# Patient Record
Sex: MALE | Race: WHITE | NOT HISPANIC OR LATINO | URBAN - METROPOLITAN AREA
[De-identification: names, ages, dates, MRNs, and addresses within clinical notes are randomized per-mention and may not be internally consistent; named-entity substitution may affect disease eponyms.]

---

## 2021-11-05 ENCOUNTER — OUTPATIENT (OUTPATIENT)
Dept: OUTPATIENT SERVICES | Facility: HOSPITAL | Age: 66
LOS: 1 days | Discharge: HOME | End: 2021-11-05
Payer: COMMERCIAL

## 2021-11-05 DIAGNOSIS — Z13.6 ENCOUNTER FOR SCREENING FOR CARDIOVASCULAR DISORDERS: ICD-10-CM

## 2021-11-05 PROCEDURE — 78452 HT MUSCLE IMAGE SPECT MULT: CPT | Mod: 26

## 2024-05-23 ENCOUNTER — NON-APPOINTMENT (OUTPATIENT)
Age: 69
End: 2024-05-23

## 2024-05-23 ENCOUNTER — APPOINTMENT (OUTPATIENT)
Dept: ORTHOPEDIC SURGERY | Facility: CLINIC | Age: 69
End: 2024-05-23
Payer: COMMERCIAL

## 2024-05-23 DIAGNOSIS — S83.91XA SPRAIN OF UNSPECIFIED SITE OF RIGHT KNEE, INITIAL ENCOUNTER: ICD-10-CM

## 2024-05-23 DIAGNOSIS — S93.401A SPRAIN OF UNSPECIFIED LIGAMENT OF RIGHT ANKLE, INITIAL ENCOUNTER: ICD-10-CM

## 2024-05-23 PROBLEM — Z00.00 ENCOUNTER FOR PREVENTIVE HEALTH EXAMINATION: Status: ACTIVE | Noted: 2024-05-23

## 2024-05-23 PROCEDURE — 99203 OFFICE O/P NEW LOW 30 MIN: CPT

## 2024-05-23 PROCEDURE — 73610 X-RAY EXAM OF ANKLE: CPT | Mod: RT

## 2024-05-23 PROCEDURE — 73562 X-RAY EXAM OF KNEE 3: CPT | Mod: RT

## 2024-05-23 RX ORDER — NABUMETONE 750 MG/1
750 TABLET, FILM COATED ORAL
Qty: 60 | Refills: 1 | Status: ACTIVE | COMMUNITY
Start: 2024-05-23 | End: 1900-01-01

## 2024-05-23 NOTE — DISCUSSION/SUMMARY
[de-identified] : Right knee and right ankle pain  HPI Patient is a 68-year-old male reports to office for evaluation of his right knee and right ankle pain that is been aggravating him for the past day.  States that he twisted awkwardly while at work.  Since then, walking, range of motion, palpating certain areas aggravate the patient's pain.  Denies any numbness or tingling.  Has taken ibuprofen which has given him minimal relief.   Right knee and right ankle x-rays taken in office today reveal no obvious fractures, subluxations, or dislocations.  Calcaneal heel spur and mildly degenerative changes noted.  Otherwise, no other significant abnormalities were seen.  Right knee exam is as follows: Minimal effusion noted.  No erythema or ecchymosis.  Able to perform active straight leg raise.  Knee flexion from 0 to 120 degrees.  Pain on posterior aspect of knee to palpation.  Calf is soft and nontender.  Equivocal Kenneth's.  Light touch intact throughout.  Mild antalgic gait.  Right foot/ankle exam is as follows: Mild lateral ankle swelling noted.  No erythema or ecchymosis.  TTP over lateral ligaments.  No Lisfranc tenderness.  Calf is soft and nontender.  Intact pulse.  Light touch intact throughout.  Mildly antalgic gait.  Assessment/plan Explained to patient that he clinically sprained his knee and ankle.  The patient was advised to rest/ice the area and may alternate with warm compresses as needed.  Instructed not to perform any strenuous activity that may worsen symptoms.  The foot/ankle and knee conditioning program from the [facility] was given to the patient so they may try that at home. Explained to the patient that this may take several weeks to fully heal and that the first 2 weeks are usually the worst.  Nabumetone 750 mg Rx sent to his pharmacy to help alleviate his symptoms.  Follow-up on as-needed basis.  All questions/concerns were answered in detail.

## 2024-09-13 ENCOUNTER — APPOINTMENT (OUTPATIENT)
Dept: ORTHOPEDIC SURGERY | Facility: CLINIC | Age: 69
End: 2024-09-13
Payer: COMMERCIAL

## 2024-09-13 DIAGNOSIS — S83.206A UNSPECIFIED TEAR OF UNSPECIFIED MENISCUS, CURRENT INJURY, RIGHT KNEE, INITIAL ENCOUNTER: ICD-10-CM

## 2024-09-13 PROCEDURE — 99213 OFFICE O/P EST LOW 20 MIN: CPT

## 2024-09-13 NOTE — DISCUSSION/SUMMARY
[de-identified] : Right knee pain follow-up  HPI Patient is a 68-year-old male who reports to office for subsequent reevaluation of his right knee pain.  Pain has been worsening of his leg.  He has tried nabumetone and at home knee conditioning exercises which have been giving him no relief.  Admits that the knee buckle/gives out on him.  Admits to a clicking sensation as well.  Walking, up/down stairs, getting up from seated position, flexing extending the knee all aggravate the patient's pain.  Denies any numbness or tingling.  Right knee exam is as follows: Mild effusion noted.  No erythema or ecchymosis.  Able to perform active straight leg raise.  Knee flexion from 0 to 120 degrees with stiffness and pain.  Medial joint line tenderness to palpation.  Calf is soft and nontender.  Positive Kenneth's.  Light touch intact throughout.  Nonantalgic gait.  Assessment/plan Explained to patient that he clinically may have a meniscal tear.  Right knee MRI ordered for further evaluation.  Patient was advised to call the office a few days after getting the MRI done to discuss results over the phone.  He has tried conservative management which is given him no relief.  He kindly declined a cortisone injection today.  Follow-up after MRI and patient will call for future follow-up appointment.  All questions/concerns were answered in detail.